# Patient Record
Sex: MALE | URBAN - METROPOLITAN AREA
[De-identification: names, ages, dates, MRNs, and addresses within clinical notes are randomized per-mention and may not be internally consistent; named-entity substitution may affect disease eponyms.]

---

## 2019-05-03 ENCOUNTER — OFFICE VISIT (OUTPATIENT)
Dept: URBAN - METROPOLITAN AREA CLINIC 17 | Facility: CLINIC | Age: 74
End: 2019-05-03
Payer: COMMERCIAL

## 2019-05-03 DIAGNOSIS — H25.13 AGE-RELATED NUCLEAR CATARACT, BILATERAL: Primary | ICD-10-CM

## 2019-05-03 DIAGNOSIS — H52.13 MYOPIA, BILATERAL: ICD-10-CM

## 2019-05-03 DIAGNOSIS — H43.813 VITREOUS DEGENERATION, BILATERAL: ICD-10-CM

## 2019-05-03 DIAGNOSIS — H35.372 PUCKERING OF MACULA, LEFT EYE: ICD-10-CM

## 2019-05-03 PROCEDURE — 92014 COMPRE OPH EXAM EST PT 1/>: CPT | Performed by: OPTOMETRIST

## 2019-05-03 PROCEDURE — 92134 CPTRZ OPH DX IMG PST SGM RTA: CPT | Performed by: OPTOMETRIST

## 2019-05-03 ASSESSMENT — VISUAL ACUITY
OD: 20/25
OS: 20/20

## 2019-05-03 ASSESSMENT — INTRAOCULAR PRESSURE
OD: 21
OS: 18

## 2019-05-03 NOTE — IMPRESSION/PLAN
Impression: Puckering of macula, left eye: H35.372.-per OCT Plan: Discussed Diagnosis w/ pt. Will continue to monitor with yearly Exams.

## 2022-04-14 ENCOUNTER — OFFICE VISIT (OUTPATIENT)
Dept: URBAN - METROPOLITAN AREA CLINIC 17 | Facility: CLINIC | Age: 77
End: 2022-04-14
Payer: COMMERCIAL

## 2022-04-14 DIAGNOSIS — H25.13 AGE-RELATED NUCLEAR CATARACT, BILATERAL: ICD-10-CM

## 2022-04-14 DIAGNOSIS — M06.9 RHEUMATOID ARTHRITIS: Primary | ICD-10-CM

## 2022-04-14 DIAGNOSIS — H52.13 MYOPIA, BILATERAL: ICD-10-CM

## 2022-04-14 DIAGNOSIS — T37.2X5A ADVERSE EFFECT OF ANTIMALARIAL, INITIAL ENCOUNTER: ICD-10-CM

## 2022-04-14 DIAGNOSIS — H04.123 DRY EYE SYNDROME OF BILATERAL LACRIMAL GLANDS: ICD-10-CM

## 2022-04-14 DIAGNOSIS — H43.813 VITREOUS DEGENERATION, BILATERAL: ICD-10-CM

## 2022-04-14 DIAGNOSIS — Z79.899 OTHER LONG TERM (CURRENT) DRUG THERAPY: ICD-10-CM

## 2022-04-14 PROCEDURE — 92134 CPTRZ OPH DX IMG PST SGM RTA: CPT | Performed by: OPTOMETRIST

## 2022-04-14 PROCEDURE — 92014 COMPRE OPH EXAM EST PT 1/>: CPT | Performed by: OPTOMETRIST

## 2022-04-14 NOTE — IMPRESSION/PLAN
Impression: Rheumatoid arthritis: M06.9. Plan: No Plaquenil toxicity, pt is currently taking 400mg daily which is within the maximum AAO guidelines of no more than 5 mg/kg/day. Discussed diagnosis in detail with patient. Discussed risks and benefits and patient understands. Advised patient of condition. Emphasized and explained compliance. Will continue to observe condition and or symptoms. Reassured patient of current condition and treatment. Ordered and performed Mac OCT today.

## 2022-04-14 NOTE — IMPRESSION/PLAN
Impression: Adverse effect of antimalarial, initial encounter: T37.2X5A. Plan: Same as above. See other note from [facility] and free T3

## 2022-04-14 NOTE — IMPRESSION/PLAN
Impression: Age-related nuclear cataract, bilateral: H25.13. Plan: Discussed diagnosis in detail with patient. No treatment is required at this time. Will continue to observe condition and or symptoms. Patient instructed to call if condition gets worse or [facility] worsens.

## 2022-04-14 NOTE — IMPRESSION/PLAN
Impression: Myopia, bilateral: H52.13. Plan: [facility] Rx dispensed adjustment expected. Discussed change in Rx with patient. Printed copy of Rx given to patient today.